# Patient Record
Sex: MALE | Race: WHITE | Employment: OTHER | URBAN - METROPOLITAN AREA
[De-identification: names, ages, dates, MRNs, and addresses within clinical notes are randomized per-mention and may not be internally consistent; named-entity substitution may affect disease eponyms.]

---

## 2018-04-03 ENCOUNTER — HOSPITAL ENCOUNTER (OUTPATIENT)
Age: 48
Discharge: HOME | End: 2018-04-05
Payer: COMMERCIAL

## 2018-04-03 LAB
CHOLESTEROL, TOTAL: 181 MG/DL (ref 0–199)
HDLC SERPL-MCNC: 33 MG/DL
LDL CHOLESTEROL CALCULATED: 108 MG/DL (ref 0–99)
T4 FREE: 1.05 NG/DL (ref 0.93–1.7)
TRIGL SERPL-MCNC: 201 MG/DL (ref 0–149)
TSH SERPL DL<=0.05 MIU/L-ACNC: 1.24 UIU/ML (ref 0.27–4.2)
VITAMIN D 25-HYDROXY: 15 NG/ML (ref 30–100)
VLDLC SERPL CALC-MCNC: 40 MG/DL

## 2018-04-03 PROCEDURE — 82306 VITAMIN D 25 HYDROXY: CPT

## 2018-04-03 PROCEDURE — 84439 ASSAY OF FREE THYROXINE: CPT

## 2018-04-03 PROCEDURE — 80061 LIPID PANEL: CPT

## 2018-04-03 PROCEDURE — 84443 ASSAY THYROID STIM HORMONE: CPT

## 2018-06-19 ENCOUNTER — HOSPITAL ENCOUNTER (EMERGENCY)
Age: 48
Discharge: HOME | End: 2018-06-19
Attending: EMERGENCY MEDICINE
Payer: COMMERCIAL

## 2018-06-19 ENCOUNTER — HOSPITAL ENCOUNTER (EMERGENCY)
Age: 48
Discharge: HOME | End: 2018-06-19
Payer: COMMERCIAL

## 2018-06-19 VITALS
HEART RATE: 58 BPM | SYSTOLIC BLOOD PRESSURE: 168 MMHG | OXYGEN SATURATION: 99 % | TEMPERATURE: 97.7 F | HEIGHT: 69 IN | RESPIRATION RATE: 18 BRPM | WEIGHT: 204 LBS | DIASTOLIC BLOOD PRESSURE: 100 MMHG | BODY MASS INDEX: 30.21 KG/M2

## 2018-06-19 VITALS
BODY MASS INDEX: 29.27 KG/M2 | RESPIRATION RATE: 16 BRPM | DIASTOLIC BLOOD PRESSURE: 80 MMHG | WEIGHT: 204.44 LBS | HEIGHT: 70 IN | OXYGEN SATURATION: 98 % | SYSTOLIC BLOOD PRESSURE: 168 MMHG | TEMPERATURE: 97.6 F | HEART RATE: 73 BPM

## 2018-06-19 DIAGNOSIS — T15.12XA FOREIGN BODY OF CONJUNCTIVA, LEFT, INITIAL ENCOUNTER: Primary | ICD-10-CM

## 2018-06-19 PROCEDURE — 99283 EMERGENCY DEPT VISIT LOW MDM: CPT

## 2018-06-19 PROCEDURE — 6370000000 HC RX 637 (ALT 250 FOR IP): Performed by: EMERGENCY MEDICINE

## 2018-06-19 RX ORDER — GENTAMICIN SULFATE 3 MG/ML
1 SOLUTION/ DROPS OPHTHALMIC EVERY 4 HOURS
Qty: 1 BOTTLE | Refills: 0 | Status: SHIPPED | OUTPATIENT
Start: 2018-06-19 | End: 2018-06-26

## 2018-06-19 RX ORDER — HYDROCODONE BITARTRATE AND ACETAMINOPHEN 5; 325 MG/1; MG/1
1 TABLET ORAL EVERY 6 HOURS PRN
Qty: 10 TABLET | Refills: 0 | Status: SHIPPED | OUTPATIENT
Start: 2018-06-19 | End: 2018-06-22

## 2018-06-19 RX ADMIN — FLUORESCEIN SODIUM 1 MG: 0.6 STRIP OPHTHALMIC at 20:36

## 2018-06-19 ASSESSMENT — PAIN DESCRIPTION - ORIENTATION
ORIENTATION: LEFT
ORIENTATION: LEFT

## 2018-06-19 ASSESSMENT — PAIN DESCRIPTION - DESCRIPTORS
DESCRIPTORS: ACHING;CONSTANT;BURNING
DESCRIPTORS: ACHING

## 2018-06-19 ASSESSMENT — VISUAL ACUITY
OD: 20/30
OS: 20/50
OU: 20/40/

## 2018-06-19 ASSESSMENT — PAIN DESCRIPTION - LOCATION
LOCATION: EYE
LOCATION: EYE

## 2018-06-19 ASSESSMENT — PAIN SCALES - GENERAL
PAINLEVEL_OUTOF10: 10
PAINLEVEL_OUTOF10: 10

## 2019-01-13 ENCOUNTER — HOSPITAL ENCOUNTER (OUTPATIENT)
Age: 49
Discharge: HOME | End: 2019-01-13
Payer: COMMERCIAL

## 2019-01-13 LAB
ALBUMIN SERPL-MCNC: 4.4 G/DL (ref 3.5–5.2)
ALP BLD-CCNC: 81 U/L (ref 40–129)
ALT SERPL-CCNC: 15 U/L (ref 0–40)
ANION GAP SERPL CALCULATED.3IONS-SCNC: 9 MMOL/L (ref 7–16)
AST SERPL-CCNC: 15 U/L (ref 0–39)
BASOPHILS ABSOLUTE: 0.04 E9/L (ref 0–0.2)
BASOPHILS RELATIVE PERCENT: 0.5 % (ref 0–2)
BILIRUB SERPL-MCNC: 0.4 MG/DL (ref 0–1.2)
BUN BLDV-MCNC: 9 MG/DL (ref 6–20)
CALCIUM SERPL-MCNC: 9.4 MG/DL (ref 8.6–10.2)
CHLORIDE BLD-SCNC: 100 MMOL/L (ref 98–107)
CHOLESTEROL, FASTING: 182 MG/DL (ref 0–199)
CO2: 28 MMOL/L (ref 22–29)
CREAT SERPL-MCNC: 1.1 MG/DL (ref 0.7–1.2)
EOSINOPHILS ABSOLUTE: 0.27 E9/L (ref 0.05–0.5)
EOSINOPHILS RELATIVE PERCENT: 3.1 % (ref 0–6)
GFR AFRICAN AMERICAN: >60
GFR NON-AFRICAN AMERICAN: >60 ML/MIN/1.73
GLUCOSE BLD-MCNC: 109 MG/DL (ref 74–99)
HCT VFR BLD CALC: 39.9 % (ref 37–54)
HDLC SERPL-MCNC: 37 MG/DL
HEMOGLOBIN: 13.6 G/DL (ref 12.5–16.5)
IMMATURE GRANULOCYTES #: 0.03 E9/L
IMMATURE GRANULOCYTES %: 0.3 % (ref 0–5)
LDL CHOLESTEROL CALCULATED: 119 MG/DL (ref 0–99)
LYMPHOCYTES ABSOLUTE: 2.29 E9/L (ref 1.5–4)
LYMPHOCYTES RELATIVE PERCENT: 26.3 % (ref 20–42)
MCH RBC QN AUTO: 29.6 PG (ref 26–35)
MCHC RBC AUTO-ENTMCNC: 34.1 % (ref 32–34.5)
MCV RBC AUTO: 86.7 FL (ref 80–99.9)
MONOCYTES ABSOLUTE: 0.53 E9/L (ref 0.1–0.95)
MONOCYTES RELATIVE PERCENT: 6.1 % (ref 2–12)
NEUTROPHILS ABSOLUTE: 5.54 E9/L (ref 1.8–7.3)
NEUTROPHILS RELATIVE PERCENT: 63.7 % (ref 43–80)
PDW BLD-RTO: 12.5 FL (ref 11.5–15)
PLATELET # BLD: 174 E9/L (ref 130–450)
PMV BLD AUTO: 10.8 FL (ref 7–12)
POTASSIUM SERPL-SCNC: 4.9 MMOL/L (ref 3.5–5)
RBC # BLD: 4.6 E12/L (ref 3.8–5.8)
SODIUM BLD-SCNC: 137 MMOL/L (ref 132–146)
T4 FREE: 1.14 NG/DL (ref 0.93–1.7)
TOTAL PROTEIN: 7.6 G/DL (ref 6.4–8.3)
TRIGLYCERIDE, FASTING: 131 MG/DL (ref 0–149)
TSH SERPL DL<=0.05 MIU/L-ACNC: 0.61 UIU/ML (ref 0.27–4.2)
VITAMIN D 25-HYDROXY: 19 NG/ML (ref 30–100)
VLDLC SERPL CALC-MCNC: 26 MG/DL
WBC # BLD: 8.7 E9/L (ref 4.5–11.5)

## 2019-01-13 PROCEDURE — 84439 ASSAY OF FREE THYROXINE: CPT

## 2019-01-13 PROCEDURE — 36415 COLL VENOUS BLD VENIPUNCTURE: CPT

## 2019-01-13 PROCEDURE — 85025 COMPLETE CBC W/AUTO DIFF WBC: CPT

## 2019-01-13 PROCEDURE — 84443 ASSAY THYROID STIM HORMONE: CPT

## 2019-01-13 PROCEDURE — 80061 LIPID PANEL: CPT

## 2019-01-13 PROCEDURE — 80053 COMPREHEN METABOLIC PANEL: CPT

## 2019-01-13 PROCEDURE — 82306 VITAMIN D 25 HYDROXY: CPT

## 2019-08-27 ENCOUNTER — HOSPITAL ENCOUNTER (OUTPATIENT)
Dept: ULTRASOUND IMAGING | Age: 49
Discharge: HOME | End: 2019-08-27
Payer: COMMERCIAL

## 2019-08-27 DIAGNOSIS — H53.9 UNSPECIFIED VISUAL DISTURBANCE: ICD-10-CM

## 2019-08-27 PROCEDURE — 93880 EXTRACRANIAL BILAT STUDY: CPT

## 2019-09-10 ENCOUNTER — HOSPITAL ENCOUNTER (OUTPATIENT)
Dept: NON INVASIVE DIAGNOSTICS | Age: 49
Discharge: HOME | End: 2019-09-10
Payer: COMMERCIAL

## 2019-09-10 ENCOUNTER — HOSPITAL ENCOUNTER (OUTPATIENT)
Dept: NUCLEAR MEDICINE | Age: 49
Discharge: HOME | End: 2019-09-10
Payer: COMMERCIAL

## 2019-09-10 VITALS
HEART RATE: 96 BPM | BODY MASS INDEX: 30.21 KG/M2 | WEIGHT: 204 LBS | SYSTOLIC BLOOD PRESSURE: 160 MMHG | DIASTOLIC BLOOD PRESSURE: 92 MMHG | HEIGHT: 69 IN

## 2019-09-10 DIAGNOSIS — I25.10 CORONARY ARTERIOSCLEROSIS: ICD-10-CM

## 2019-09-10 LAB
LV EF: 64 %
LVEF MODALITY: NORMAL

## 2019-09-10 PROCEDURE — 93017 CV STRESS TEST TRACING ONLY: CPT

## 2019-09-10 PROCEDURE — 3430000000 HC RX DIAGNOSTIC RADIOPHARMACEUTICAL: Performed by: RADIOLOGY

## 2019-09-10 PROCEDURE — 78452 HT MUSCLE IMAGE SPECT MULT: CPT

## 2019-09-10 PROCEDURE — A9500 TC99M SESTAMIBI: HCPCS | Performed by: RADIOLOGY

## 2019-09-10 RX ORDER — OMEPRAZOLE 40 MG/1
40 CAPSULE, DELAYED RELEASE ORAL DAILY
COMMUNITY

## 2019-09-10 RX ADMIN — Medication 30 MILLICURIE: at 14:39

## 2019-09-10 RX ADMIN — Medication 10 MILLICURIE: at 11:21

## 2019-09-10 NOTE — PROCEDURES
Dr. Bennett Martinez present, Completed Nuclear exercise Stress test, achieved desires maximal predicted heart rate. Patient denied chest pain.

## 2021-10-25 ENCOUNTER — HOSPITAL ENCOUNTER (EMERGENCY)
Age: 51
Discharge: HOME | End: 2021-10-25
Attending: EMERGENCY MEDICINE
Payer: COMMERCIAL

## 2021-10-25 ENCOUNTER — APPOINTMENT (OUTPATIENT)
Dept: GENERAL RADIOLOGY | Age: 51
End: 2021-10-25
Payer: COMMERCIAL

## 2021-10-25 VITALS
RESPIRATION RATE: 18 BRPM | OXYGEN SATURATION: 99 % | TEMPERATURE: 98.2 F | SYSTOLIC BLOOD PRESSURE: 149 MMHG | DIASTOLIC BLOOD PRESSURE: 108 MMHG | HEART RATE: 70 BPM

## 2021-10-25 DIAGNOSIS — R00.2 PALPITATIONS: Primary | ICD-10-CM

## 2021-10-25 LAB
ALBUMIN SERPL-MCNC: 4.4 G/DL (ref 3.5–5.2)
ALP BLD-CCNC: 82 U/L (ref 40–129)
ALT SERPL-CCNC: 21 U/L (ref 0–40)
ANION GAP SERPL CALCULATED.3IONS-SCNC: 10 MMOL/L (ref 7–16)
AST SERPL-CCNC: 14 U/L (ref 0–39)
BACTERIA: ABNORMAL /HPF
BASOPHILS ABSOLUTE: 0.05 E9/L (ref 0–0.2)
BASOPHILS RELATIVE PERCENT: 0.7 % (ref 0–2)
BILIRUB SERPL-MCNC: 0.5 MG/DL (ref 0–1.2)
BILIRUBIN URINE: NEGATIVE
BLOOD, URINE: ABNORMAL
BUN BLDV-MCNC: 12 MG/DL (ref 6–20)
CALCIUM SERPL-MCNC: 9.7 MG/DL (ref 8.6–10.2)
CHLORIDE BLD-SCNC: 101 MMOL/L (ref 98–107)
CLARITY: CLEAR
CO2: 26 MMOL/L (ref 22–29)
COLOR: YELLOW
CREAT SERPL-MCNC: 1.1 MG/DL (ref 0.7–1.2)
D DIMER: <200 NG/ML DDU
EKG ATRIAL RATE: 96 BPM
EKG P AXIS: 58 DEGREES
EKG P-R INTERVAL: 124 MS
EKG Q-T INTERVAL: 370 MS
EKG QRS DURATION: 84 MS
EKG QTC CALCULATION (BAZETT): 467 MS
EKG R AXIS: 52 DEGREES
EKG T AXIS: 66 DEGREES
EKG VENTRICULAR RATE: 96 BPM
EOSINOPHILS ABSOLUTE: 0.3 E9/L (ref 0.05–0.5)
EOSINOPHILS RELATIVE PERCENT: 4.1 % (ref 0–6)
ETHANOL: <10 MG/DL (ref 0–0.08)
GFR AFRICAN AMERICAN: >60
GFR NON-AFRICAN AMERICAN: >60 ML/MIN/1.73
GLUCOSE BLD-MCNC: 141 MG/DL (ref 74–99)
GLUCOSE URINE: NEGATIVE MG/DL
HCT VFR BLD CALC: 40.4 % (ref 37–54)
HEMOGLOBIN: 13.7 G/DL (ref 12.5–16.5)
IMMATURE GRANULOCYTES #: 0.03 E9/L
IMMATURE GRANULOCYTES %: 0.4 % (ref 0–5)
KETONES, URINE: NEGATIVE MG/DL
LEUKOCYTE ESTERASE, URINE: NEGATIVE
LYMPHOCYTES ABSOLUTE: 1.76 E9/L (ref 1.5–4)
LYMPHOCYTES RELATIVE PERCENT: 23.9 % (ref 20–42)
MAGNESIUM: 1.7 MG/DL (ref 1.6–2.6)
MCH RBC QN AUTO: 30.9 PG (ref 26–35)
MCHC RBC AUTO-ENTMCNC: 33.9 % (ref 32–34.5)
MCV RBC AUTO: 91.2 FL (ref 80–99.9)
MONOCYTES ABSOLUTE: 0.47 E9/L (ref 0.1–0.95)
MONOCYTES RELATIVE PERCENT: 6.4 % (ref 2–12)
NEUTROPHILS ABSOLUTE: 4.76 E9/L (ref 1.8–7.3)
NEUTROPHILS RELATIVE PERCENT: 64.5 % (ref 43–80)
NITRITE, URINE: NEGATIVE
PDW BLD-RTO: 12.4 FL (ref 11.5–15)
PH UA: 6 (ref 5–9)
PLATELET # BLD: 168 E9/L (ref 130–450)
PMV BLD AUTO: 11.1 FL (ref 7–12)
POTASSIUM SERPL-SCNC: 3.5 MMOL/L (ref 3.5–5)
PROTEIN UA: NEGATIVE MG/DL
RBC # BLD: 4.43 E12/L (ref 3.8–5.8)
RBC UA: ABNORMAL /HPF (ref 0–2)
SODIUM BLD-SCNC: 137 MMOL/L (ref 132–146)
SPECIFIC GRAVITY UA: 1.01 (ref 1–1.03)
TOTAL PROTEIN: 7.4 G/DL (ref 6.4–8.3)
TROPONIN, HIGH SENSITIVITY: 9 NG/L (ref 0–11)
UROBILINOGEN, URINE: 0.2 E.U./DL
WBC # BLD: 7.4 E9/L (ref 4.5–11.5)
WBC UA: ABNORMAL /HPF (ref 0–5)

## 2021-10-25 PROCEDURE — 99283 EMERGENCY DEPT VISIT LOW MDM: CPT

## 2021-10-25 PROCEDURE — 36415 COLL VENOUS BLD VENIPUNCTURE: CPT

## 2021-10-25 PROCEDURE — 2580000003 HC RX 258: Performed by: EMERGENCY MEDICINE

## 2021-10-25 PROCEDURE — 81001 URINALYSIS AUTO W/SCOPE: CPT

## 2021-10-25 PROCEDURE — 80053 COMPREHEN METABOLIC PANEL: CPT

## 2021-10-25 PROCEDURE — 85378 FIBRIN DEGRADE SEMIQUANT: CPT

## 2021-10-25 PROCEDURE — 82077 ASSAY SPEC XCP UR&BREATH IA: CPT

## 2021-10-25 PROCEDURE — 83735 ASSAY OF MAGNESIUM: CPT

## 2021-10-25 PROCEDURE — 85025 COMPLETE CBC W/AUTO DIFF WBC: CPT

## 2021-10-25 PROCEDURE — 93005 ELECTROCARDIOGRAM TRACING: CPT | Performed by: PHYSICIAN ASSISTANT

## 2021-10-25 PROCEDURE — 84484 ASSAY OF TROPONIN QUANT: CPT

## 2021-10-25 PROCEDURE — 73610 X-RAY EXAM OF ANKLE: CPT

## 2021-10-25 RX ORDER — 0.9 % SODIUM CHLORIDE 0.9 %
1000 INTRAVENOUS SOLUTION INTRAVENOUS ONCE
Status: COMPLETED | OUTPATIENT
Start: 2021-10-25 | End: 2021-10-25

## 2021-10-25 RX ADMIN — SODIUM CHLORIDE 1000 ML: 9 INJECTION, SOLUTION INTRAVENOUS at 14:32

## 2021-10-25 ASSESSMENT — ENCOUNTER SYMPTOMS
SHORTNESS OF BREATH: 0
NAUSEA: 0
ABDOMINAL PAIN: 0
VOMITING: 0
CHEST TIGHTNESS: 0
CONSTIPATION: 0
BACK PAIN: 0
DIARRHEA: 1
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
## 2021-10-25 NOTE — ED NOTES
Re-eval per attending ED physician review of findings and discharge [id number] [facility]  10/25/21 9245

## 2021-10-25 NOTE — ED NOTES
FIRST PROVIDER CONTACT ASSESSMENT NOTE                                                                                                Department of Emergency Medicine                                                      First Provider Note  10/25/21  11:22 AM EDT  NAME: Chichi Chong  : 1970  MRN: [id number]    Chief Complaint: Other (dizziness with hot flash this am while at work) and Palpitations      History of Present Illness:   Chcihi Chong is a 49 y.o. male who presents to the ED for near syncope earlier today. C/o pounding in his chest as well. Focused Physical Exam:  VS:    ED Triage Vitals [10/25/21 1046]   BP Temp Temp Source Pulse Resp SpO2 Height Weight   -- 98.4 °F (36.9 °C) Tympanic 114 -- 99 % -- --        General: Alert and in no apparent distress. Medical History:  has a past medical history of Family history of early CAD and History of cardiovascular stress test.    Surgical History:  has a past surgical history that includes Femur fracture surgery () and Cholecystectomy. Social History:  reports that he quit smoking about 7 years ago. He smoked 0.75 packs per day. He has never used smokeless tobacco. He reports current alcohol use. He reports current drug use. Drug: Marijuana. Family History: family history includes Heart Attack in his maternal grandmother; Heart Attack (age of onset: 39) in his mother.     Allergies: Asa [aspirin] and Ibuprofen     Initial Plan of Care:  Initiate Treatment-Testing, Proceed toTreatment Area When Bed Available for ED Attending/MLP to Continue Care    -------------------------------------------------END OF FIRST PROVIDER CONTACT ASSESSMENT NOTE--------------------------------------------------------  Electronically signed by NANCY Neves   DD: 10/25/21       NANCY Neves  10/25/21 1123

## 2022-10-31 ENCOUNTER — APPOINTMENT (OUTPATIENT)
Dept: GENERAL RADIOLOGY | Age: 52
End: 2022-10-31
Payer: COMMERCIAL

## 2022-10-31 ENCOUNTER — APPOINTMENT (OUTPATIENT)
Dept: CT IMAGING | Age: 52
End: 2022-10-31
Payer: COMMERCIAL

## 2022-10-31 ENCOUNTER — HOSPITAL ENCOUNTER (EMERGENCY)
Age: 52
Discharge: HOME | End: 2022-10-31
Payer: COMMERCIAL

## 2022-10-31 VITALS
SYSTOLIC BLOOD PRESSURE: 168 MMHG | HEART RATE: 79 BPM | OXYGEN SATURATION: 96 % | DIASTOLIC BLOOD PRESSURE: 103 MMHG | RESPIRATION RATE: 18 BRPM | TEMPERATURE: 98.5 F

## 2022-10-31 DIAGNOSIS — R03.0 FINDING OF ABOVE NORMAL BLOOD PRESSURE: ICD-10-CM
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
## 2022-10-31 DIAGNOSIS — M54.2 NECK PAIN: ICD-10-CM

## 2022-10-31 DIAGNOSIS — S39.012A STRAIN OF LUMBAR REGION, INITIAL ENCOUNTER: ICD-10-CM

## 2022-10-31 PROCEDURE — 70450 CT HEAD/BRAIN W/O DYE: CPT

## 2022-10-31 PROCEDURE — 72125 CT NECK SPINE W/O DYE: CPT

## 2022-10-31 PROCEDURE — 72072 X-RAY EXAM THORAC SPINE 3VWS: CPT

## 2022-10-31 PROCEDURE — 72100 X-RAY EXAM L-S SPINE 2/3 VWS: CPT

## 2022-10-31 PROCEDURE — 99284 EMERGENCY DEPT VISIT MOD MDM: CPT

## 2022-10-31 PROCEDURE — 6370000000 HC RX 637 (ALT 250 FOR IP): Performed by: NURSE PRACTITIONER

## 2022-10-31 RX ORDER — LIDOCAINE 4 G/G
1 PATCH TOPICAL DAILY
Qty: 10 PATCH | Refills: 0 | Status: SHIPPED | OUTPATIENT
Start: 2022-10-31 | End: 2022-11-10

## 2022-10-31 RX ORDER — OXYCODONE HYDROCHLORIDE AND ACETAMINOPHEN 5; 325 MG/1; MG/1
1 TABLET ORAL ONCE
Status: COMPLETED | OUTPATIENT
Start: 2022-10-31 | End: 2022-10-31

## 2022-10-31 RX ORDER — CYCLOBENZAPRINE HCL 5 MG
5 TABLET ORAL NIGHTLY PRN
Qty: 7 TABLET | Refills: 0 | Status: SHIPPED | OUTPATIENT
Start: 2022-10-31 | End: 2022-11-07

## 2022-10-31 RX ADMIN — OXYCODONE AND ACETAMINOPHEN 1 TABLET: 5; 325 TABLET ORAL at 19:57

## 2022-10-31 ASSESSMENT — PAIN SCALES - GENERAL
PAINLEVEL_OUTOF10: 8
PAINLEVEL_OUTOF10: 8

## 2022-10-31 ASSESSMENT — PAIN - FUNCTIONAL ASSESSMENT: PAIN_FUNCTIONAL_ASSESSMENT: 0-10
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
## 2024-12-09 ENCOUNTER — TELEPHONE (OUTPATIENT)
Dept: PHYSICAL MEDICINE AND REHAB | Age: 54
End: 2024-12-09

## 2024-12-09 NOTE — TELEPHONE ENCOUNTER
Pt called in to [facility] an EMG, he is requesting to have it done with Dr. Han, there are no available appts next year on the EMG for her. Please, advise. Thank you

## 2024-12-12 ENCOUNTER — PROCEDURE VISIT (OUTPATIENT)
Dept: PHYSICAL MEDICINE AND REHAB | Age: 54
End: 2024-12-12

## 2024-12-12 VITALS — HEIGHT: 69 IN | WEIGHT: 193 LBS | BODY MASS INDEX: 28.58 KG/M2

## 2024-12-12 DIAGNOSIS — R20.2 PARESTHESIA OF SKIN: Primary | ICD-10-CM

## 2024-12-12 DIAGNOSIS — R20.2 PARESTHESIA OF SKIN: ICD-10-CM

## 2024-12-12 DIAGNOSIS — G56.03 BILATERAL CARPAL TUNNEL SYNDROME: Primary | ICD-10-CM

## 2024-12-12 NOTE — PATIENT INSTRUCTIONS
Electrodiagnotic Laboratory  Accredited [facility] the [facility]  THUY Maxwell D.O.   [facility]  [address] [place] [address]  Phone: [phone]  Fax: [phone]        Today you had an electrodiagnostic exam which included nerve conduction studies (NCS) and electromyography (EMG). This test evaluated the electrical activity of your nerves and muscles to help determine if you have a nerve or muscle disease.  This test can help determine the location and type of a nerve or muscle problem. This will help your referring doctor diagnose your condition and determine the appropriate next step in your treatment plan.     After your test:    1. There are no long lasting side effects of the test.     2. You may resume your normal activities without restrictions.     3.  Resume any medications that were stopped for the test.     4  If you have sore areas or bruising in your muscles where the needle was placed, apply a cold pack to the sore area for 15-20 minutes three to four times a day as needed for pain.  The soreness should go away in about 1-2 days.     5. Your results were provided  Briefly at the end of your test and the final detailed report will be provided to your referring physician, and/or primary care physician and any other parties you requested within 1-2 days of the examination. You may wish to contact your referring provider after a few days to determine what they would like you to do next.     6.  Please call [phone] with any questions or concerns and if you develop increased body temperature/fever, swelling, tenderness, increased pain and/or drainage from the sites where the needle was placed.     Thank you for choosing us for your health care needs.

## 2024-12-12 NOTE — PROGRESS NOTES
[facility]  Electrodiagnostic Laboratory  *[facility] the [facility] with [facility] status  [address] [place] [address]  Phone: [phone]  Fax: [phone]    Referring Provider: Juan Byrnes, *  Primary Care Physician: Juan Byrnes,   Patient Name: Killian Au  Patient YOB: 1970  Gender: male  BMI: Body mass index is 28.5 kg/m².  Height 1.753 m (5' 9\"), weight 87.5 kg (193 lb).    12/12/2024    Reason for Referral: Paresthesia of skin    Description of clinical problem:   Chief Complaint   Patient presents with    Extremity Pain     Pain in both hands and at times the arm. Pins and needles feeling in the pads of the feet into the toes. Both sides feel the same.     Numbness     Numbness/tingling in the right forearm and into the last two fingers.     Extremity Weakness     Decrease strength in the arm and hands.        Brief physical exam:   Sensory deficit Yes- decreased sensation LT in toes bilaterally; Weakness No; Atrophy  No; Reflex abnormality No    Sensory NCS      Nerve / Sites Rec. Site Peak Lat PP Amp Segments Distance Velocity Temp.     ms µV  cm m/s °C   R Median - Digit II (Antidromic)      Palm Dig II 1.98 13.4 Palm - Dig II 7 48 33      Wrist Dig II 4.06 13.3 Wrist - Dig II 14 44 33   L Median - Digit II (Antidromic)      Palm Dig II 1.82 22.3 Palm - Dig II 7 58 33      Wrist Dig II 3.65 21.3 Wrist - Dig II 14 46 33   R Ulnar - Digit V (Antidromic)      Wrist Dig V 3.54 20.5 Wrist - Dig V 14 52 33   R Radial - Anatomical snuff box (Forearm)      Forearm Wrist 2.45 21.5 Forearm - Wrist 10 58 32.5   R Sural - Ankle (Calf)      Calf Ankle 3.96 20.5 Calf - Ankle 14 43 31.6   R Superficial peroneal - Ankle      Lat leg Ankle 3.23 16.5 Lat leg - Ankle 10 42 31.5       Combined Sensory Index      Nerve / Sites Rec. Site Peak Lat NP Amp PP Amp Segments Dist. Peak Diff Temp.     ms µV µV  cm ms °C   L Median - CSI      Median Thumb

## 2025-01-05 ENCOUNTER — HOSPITAL ENCOUNTER (EMERGENCY)
Age: 55
Discharge: HOME | End: 2025-01-05
Attending: STUDENT IN AN ORGANIZED HEALTH CARE EDUCATION/TRAINING PROGRAM
Payer: COMMERCIAL

## 2025-01-05 ENCOUNTER — APPOINTMENT (OUTPATIENT)
Dept: CT IMAGING | Age: 55
End: 2025-01-05
Payer: COMMERCIAL

## 2025-01-05 VITALS
RESPIRATION RATE: 14 BRPM | HEART RATE: 98 BPM | OXYGEN SATURATION: 93 % | TEMPERATURE: 97.4 F | DIASTOLIC BLOOD PRESSURE: 92 MMHG | SYSTOLIC BLOOD PRESSURE: 133 MMHG

## 2025-01-05 DIAGNOSIS — R19.7 NAUSEA VOMITING AND DIARRHEA: Primary | ICD-10-CM

## 2025-01-05 DIAGNOSIS — I73.9 PAD (PERIPHERAL ARTERY DISEASE) (HCC): ICD-10-CM

## 2025-01-05 DIAGNOSIS — R11.2 NAUSEA VOMITING AND DIARRHEA: Primary | ICD-10-CM

## 2025-01-05 DIAGNOSIS — I71.43 INFRARENAL ABDOMINAL AORTIC ANEURYSM (AAA) WITHOUT RUPTURE (HCC): ICD-10-CM

## 2025-01-05 LAB
ALBUMIN SERPL-MCNC: 4.2 G/DL (ref 3.5–5.2)
ALP SERPL-CCNC: 98 U/L (ref 40–129)
ALT SERPL-CCNC: 12 U/L (ref 0–40)
ANION GAP SERPL CALCULATED.3IONS-SCNC: 15 MMOL/L (ref 7–16)
AST SERPL-CCNC: 12 U/L (ref 0–39)
BASOPHILS # BLD: 0.06 K/UL (ref 0–0.2)
BASOPHILS NFR BLD: 0 % (ref 0–2)
BILIRUB SERPL-MCNC: 0.3 MG/DL (ref 0–1.2)
BUN SERPL-MCNC: 17 MG/DL (ref 6–20)
CALCIUM SERPL-MCNC: 9.4 MG/DL (ref 8.6–10.2)
CHLORIDE SERPL-SCNC: 103 MMOL/L (ref 98–107)
CK SERPL-CCNC: 51 U/L (ref 20–200)
CO2 SERPL-SCNC: 23 MMOL/L (ref 22–29)
CREAT SERPL-MCNC: 1.3 MG/DL (ref 0.7–1.2)
EOSINOPHIL # BLD: 0.3 K/UL (ref 0.05–0.5)
EOSINOPHILS RELATIVE PERCENT: 2 % (ref 0–6)
ERYTHROCYTE [DISTWIDTH] IN BLOOD BY AUTOMATED COUNT: 12.2 % (ref 11.5–15)
FLUAV RNA RESP QL NAA+PROBE: NOT DETECTED
FLUBV RNA RESP QL NAA+PROBE: NOT DETECTED
GFR, ESTIMATED: 68 ML/MIN/1.73M2
GLUCOSE SERPL-MCNC: 162 MG/DL (ref 74–99)
HCT VFR BLD AUTO: 47.8 % (ref 37–54)
HGB BLD-MCNC: 16.2 G/DL (ref 12.5–16.5)
IMM GRANULOCYTES # BLD AUTO: 0.16 K/UL (ref 0–0.58)
IMM GRANULOCYTES NFR BLD: 1 % (ref 0–5)
LACTATE BLDV-SCNC: 2.5 MMOL/L (ref 0.5–1.9)
LACTATE BLDV-SCNC: 2.6 MMOL/L (ref 0.5–2.2)
LACTATE BLDV-SCNC: 2.9 MMOL/L (ref 0.5–1.9)
LACTATE BLDV-SCNC: 3.2 MMOL/L (ref 0.5–2.2)
LIPASE SERPL-CCNC: 54 U/L (ref 13–60)
LYMPHOCYTES NFR BLD: 1.38 K/UL (ref 1.5–4)
LYMPHOCYTES RELATIVE PERCENT: 8 % (ref 20–42)
MCH RBC QN AUTO: 30.9 PG (ref 26–35)
MCHC RBC AUTO-ENTMCNC: 33.9 G/DL (ref 32–34.5)
MCV RBC AUTO: 91 FL (ref 80–99.9)
MONOCYTES NFR BLD: 0.85 K/UL (ref 0.1–0.95)
MONOCYTES NFR BLD: 5 % (ref 2–12)
NEUTROPHILS NFR BLD: 84 % (ref 43–80)
NEUTS SEG NFR BLD: 14.75 K/UL (ref 1.8–7.3)
PLATELET # BLD AUTO: 216 K/UL (ref 130–450)
PMV BLD AUTO: 10.8 FL (ref 7–12)
POTASSIUM SERPL-SCNC: 3.9 MMOL/L (ref 3.5–5)
PROT SERPL-MCNC: 7.4 G/DL (ref 6.4–8.3)
RBC # BLD AUTO: 5.25 M/UL (ref 3.8–5.8)
SARS-COV-2 RNA RESP QL NAA+PROBE: NOT DETECTED
SODIUM SERPL-SCNC: 141 MMOL/L (ref 132–146)
SOURCE: NORMAL
SPECIMEN DESCRIPTION: NORMAL
TROPONIN I SERPL HS-MCNC: <6 NG/L (ref 0–11)
TROPONIN I SERPL HS-MCNC: <6 NG/L (ref 0–11)
WBC OTHER # BLD: 17.5 K/UL (ref 4.5–11.5)

## 2025-01-05 PROCEDURE — 80053 COMPREHEN METABOLIC PANEL: CPT

## 2025-01-05 PROCEDURE — 96375 TX/PRO/DX INJ NEW DRUG ADDON: CPT

## 2025-01-05 PROCEDURE — 82550 ASSAY OF CK (CPK): CPT

## 2025-01-05 PROCEDURE — 87636 SARSCOV2 & INF A&B AMP PRB: CPT

## 2025-01-05 PROCEDURE — 6360000002 HC RX W HCPCS: Performed by: EMERGENCY MEDICINE

## 2025-01-05 PROCEDURE — 2580000003 HC RX 258: Performed by: EMERGENCY MEDICINE

## 2025-01-05 PROCEDURE — 84484 ASSAY OF TROPONIN QUANT: CPT

## 2025-01-05 PROCEDURE — 83605 ASSAY OF LACTIC ACID: CPT

## 2025-01-05 PROCEDURE — 83690 ASSAY OF LIPASE: CPT

## 2025-01-05 PROCEDURE — 6370000000 HC RX 637 (ALT 250 FOR IP): Performed by: EMERGENCY MEDICINE

## 2025-01-05 PROCEDURE — 75635 CT ANGIO ABDOMINAL ARTERIES: CPT

## 2025-01-05 PROCEDURE — 85025 COMPLETE CBC W/AUTO DIFF WBC: CPT

## 2025-01-05 PROCEDURE — 96374 THER/PROPH/DIAG INJ IV PUSH: CPT

## 2025-01-05 PROCEDURE — 99285 EMERGENCY DEPT VISIT HI MDM: CPT

## 2025-01-05 PROCEDURE — 2580000003 HC RX 258: Performed by: STUDENT IN AN ORGANIZED HEALTH CARE EDUCATION/TRAINING PROGRAM

## 2025-01-05 PROCEDURE — 96361 HYDRATE IV INFUSION ADD-ON: CPT

## 2025-01-05 PROCEDURE — 87040 BLOOD CULTURE FOR BACTERIA: CPT

## 2025-01-05 PROCEDURE — 93005 ELECTROCARDIOGRAM TRACING: CPT | Performed by: STUDENT IN AN ORGANIZED HEALTH CARE EDUCATION/TRAINING PROGRAM

## 2025-01-05 PROCEDURE — 6360000004 HC RX CONTRAST MEDICATION: Performed by: RADIOLOGY

## 2025-01-05 PROCEDURE — 6360000002 HC RX W HCPCS: Performed by: STUDENT IN AN ORGANIZED HEALTH CARE EDUCATION/TRAINING PROGRAM

## 2025-01-05 RX ORDER — OXYCODONE AND ACETAMINOPHEN 10; 325 MG/1; MG/1
1 TABLET ORAL ONCE
Status: COMPLETED | OUTPATIENT
Start: 2025-01-05 | End: 2025-01-05

## 2025-01-05 RX ORDER — LORAZEPAM 1 MG/1
1 TABLET ORAL ONCE
Status: COMPLETED | OUTPATIENT
Start: 2025-01-05 | End: 2025-01-05

## 2025-01-05 RX ORDER — ONDANSETRON 2 MG/ML
4 INJECTION INTRAMUSCULAR; INTRAVENOUS ONCE
Status: COMPLETED | OUTPATIENT
Start: 2025-01-05 | End: 2025-01-05

## 2025-01-05 RX ORDER — FENTANYL CITRATE 0.05 MG/ML
50 INJECTION, SOLUTION INTRAMUSCULAR; INTRAVENOUS ONCE
Status: COMPLETED | OUTPATIENT
Start: 2025-01-05 | End: 2025-01-05

## 2025-01-05 RX ORDER — 0.9 % SODIUM CHLORIDE 0.9 %
1000 INTRAVENOUS SOLUTION INTRAVENOUS ONCE
Status: COMPLETED | OUTPATIENT
Start: 2025-01-05 | End: 2025-01-05

## 2025-01-05 RX ORDER — IOPAMIDOL 755 MG/ML
110 INJECTION, SOLUTION INTRAVASCULAR
Status: COMPLETED | OUTPATIENT
Start: 2025-01-05 | End: 2025-01-05

## 2025-01-05 RX ORDER — MORPHINE SULFATE 4 MG/ML
4 INJECTION, SOLUTION INTRAMUSCULAR; INTRAVENOUS ONCE
Status: COMPLETED | OUTPATIENT
Start: 2025-01-05 | End: 2025-01-05

## 2025-01-05 RX ORDER — GABAPENTIN 300 MG/1
300 CAPSULE ORAL ONCE
Status: COMPLETED | OUTPATIENT
Start: 2025-01-05 | End: 2025-01-05

## 2025-01-05 RX ADMIN — SODIUM CHLORIDE 1000 ML: 9 INJECTION, SOLUTION INTRAVENOUS at 05:43

## 2025-01-05 RX ADMIN — GABAPENTIN 300 MG: 300 CAPSULE ORAL at 09:12

## 2025-01-05 RX ADMIN — FENTANYL CITRATE 50 MCG: 0.05 INJECTION, SOLUTION INTRAMUSCULAR; INTRAVENOUS at 05:43

## 2025-01-05 RX ADMIN — OXYCODONE AND ACETAMINOPHEN 1 TABLET: 10; 325 TABLET ORAL at 13:39

## 2025-01-05 RX ADMIN — LORAZEPAM 1 MG: 1 TABLET ORAL at 09:12

## 2025-01-05 RX ADMIN — SODIUM CHLORIDE 1000 ML: 9 INJECTION, SOLUTION INTRAVENOUS at 15:02

## 2025-01-05 RX ADMIN — ONDANSETRON 4 MG: 2 INJECTION, SOLUTION INTRAMUSCULAR; INTRAVENOUS at 05:43

## 2025-01-05 RX ADMIN — MORPHINE SULFATE 4 MG: 4 INJECTION, SOLUTION INTRAMUSCULAR; INTRAVENOUS at 09:12

## 2025-01-05 RX ADMIN — IOPAMIDOL 110 ML: 755 INJECTION, SOLUTION INTRAVENOUS at 07:27

## 2025-01-05 ASSESSMENT — PAIN SCALES - GENERAL
PAINLEVEL_OUTOF10: 10
PAINLEVEL_OUTOF10: 7
PAINLEVEL_OUTOF10: 10
PAINLEVEL_OUTOF10: 8

## 2025-01-05 ASSESSMENT — PAIN DESCRIPTION - DESCRIPTORS: DESCRIPTORS: ACHING;SHARP

## 2025-01-05 ASSESSMENT — PAIN - FUNCTIONAL ASSESSMENT
PAIN_FUNCTIONAL_ASSESSMENT: 0-10
PAIN_FUNCTIONAL_ASSESSMENT: ACTIVITIES ARE NOT PREVENTED

## 2025-01-05 ASSESSMENT — PAIN DESCRIPTION - LOCATION
LOCATION: ABDOMEN;HIP;LEG
LOCATION: ABDOMEN;HIP

## 2025-01-05 ASSESSMENT — PAIN DESCRIPTION - ORIENTATION: ORIENTATION: RIGHT;LEFT

## 2025-01-05 NOTE — ED PROVIDER NOTES
5. Some fluid-filled loops of mid and distal small bowel. There is fluid as   well seen scattered throughout the colon. Findings to suggest a nonspecific   enterocolitis. No significant wall thickening. No signs of obstruction.   6. Some bronchial wall thickening identified into the lower lung fields to   suggest a nonspecific bronchiolitis.             ------------------------- NURSING NOTES AND VITALS REVIEWED ---------------------------  Date / Time Roomed:  1/5/2025  5:07 AM  ED Bed Assignment:  [facility]/H4    The nursing notes within the ED encounter and vital signs as below have been reviewed.   BP (!) 133/92   Pulse 98   Temp 97.4 °F (36.3 °C) (Oral)   Resp 14   SpO2 93%   Oxygen Saturation Interpretation: Normal      --------------------------------- ADDITIONAL PROVIDER NOTES ---------------------------------  At this time the patient is without objective evidence of an acute process requiring hospitalization or inpatient management.  They have remained hemodynamically stable throughout their entire ED visit and are stable for discharge with outpatient follow-up.     The plan has been discussed in detail and they are aware of the specific conditions for emergent return, as well as the importance of follow-up.      Discharge Medication List as of 1/5/2025  3:34 PM          Diagnosis:  1. Nausea vomiting and diarrhea    2. Infrarenal abdominal aortic aneurysm (AAA) without rupture ([facility])    3. PAD (peripheral artery disease) ([facility])        Disposition:  Patient's disposition: Discharge to home  Patient's condition is stable.            Tuan Parisi DO  01/05/25 2035

## 2025-01-06 ENCOUNTER — TELEPHONE (OUTPATIENT)
Dept: VASCULAR SURGERY | Age: 55
End: 2025-01-06

## 2025-01-06 PROBLEM — I77.1 RIGHT ILIAC ARTERY STENOSIS (HCC): Status: ACTIVE | Noted: 2025-01-06

## 2025-01-06 PROBLEM — I70.209 FEMORAL-POPLITEAL ATHEROSCLEROSIS (HCC): Status: ACTIVE | Noted: 2025-01-06

## 2025-01-06 PROBLEM — I71.43 INFRARENAL ABDOMINAL AORTIC ANEURYSM (AAA) WITHOUT RUPTURE (HCC): Status: ACTIVE | Noted: 2025-01-06

## 2025-01-06 LAB
EKG ATRIAL RATE: 105 BPM
EKG P AXIS: 66 DEGREES
EKG P-R INTERVAL: 122 MS
EKG Q-T INTERVAL: 336 MS
EKG QRS DURATION: 86 MS
EKG QTC CALCULATION (BAZETT): 444 MS
EKG R AXIS: 78 DEGREES
EKG T AXIS: -6 DEGREES
EKG VENTRICULAR RATE: 105 BPM

## 2025-01-06 PROCEDURE — 93010 ELECTROCARDIOGRAM REPORT: CPT | Performed by: INTERNAL MEDICINE

## 2025-01-10 LAB
MICROORGANISM SPEC CULT: NORMAL
MICROORGANISM SPEC CULT: NORMAL
SERVICE CMNT-IMP: NORMAL
SERVICE CMNT-IMP: NORMAL
SPECIMEN DESCRIPTION: NORMAL
SPECIMEN DESCRIPTION: NORMAL

## 2025-01-23 ENCOUNTER — OFFICE VISIT (OUTPATIENT)
Dept: VASCULAR SURGERY | Age: 55
End: 2025-01-23
Payer: COMMERCIAL

## 2025-01-23 VITALS — BODY MASS INDEX: 28.5 KG/M2 | WEIGHT: 193 LBS

## 2025-01-23 DIAGNOSIS — I73.9 PVD (PERIPHERAL VASCULAR DISEASE) WITH CLAUDICATION (HCC): ICD-10-CM

## 2025-01-23 DIAGNOSIS — I70.209 FEMORAL-POPLITEAL ATHEROSCLEROSIS (HCC): ICD-10-CM

## 2025-01-23 DIAGNOSIS — I71.43 INFRARENAL ABDOMINAL AORTIC ANEURYSM (AAA) WITHOUT RUPTURE (HCC): ICD-10-CM

## 2025-01-23 DIAGNOSIS — I77.1 RIGHT ILIAC ARTERY STENOSIS (HCC): Primary | ICD-10-CM

## 2025-01-23 DIAGNOSIS — Z87.891 HISTORY OF TOBACCO USE: ICD-10-CM

## 2025-01-23 PROCEDURE — 3017F COLORECTAL CA SCREEN DOC REV: CPT | Performed by: SURGERY

## 2025-01-23 PROCEDURE — 99204 OFFICE O/P NEW MOD 45 MIN: CPT | Performed by: SURGERY

## 2025-01-23 PROCEDURE — G8427 DOCREV CUR MEDS BY ELIG CLIN: HCPCS | Performed by: SURGERY

## 2025-01-23 PROCEDURE — G8419 CALC BMI OUT NRM PARAM NOF/U: HCPCS | Performed by: SURGERY

## 2025-01-23 PROCEDURE — 1036F TOBACCO NON-USER: CPT | Performed by: SURGERY

## 2025-01-23 RX ORDER — LOSARTAN POTASSIUM 50 MG/1
50 TABLET ORAL DAILY
COMMUNITY

## 2025-01-23 RX ORDER — PANTOPRAZOLE SODIUM 40 MG/10ML
40 INJECTION, POWDER, LYOPHILIZED, FOR SOLUTION INTRAVENOUS DAILY
COMMUNITY

## 2025-01-23 RX ORDER — LORAZEPAM 1 MG/1
1 TABLET ORAL EVERY 6 HOURS PRN
COMMUNITY

## 2025-01-23 RX ORDER — GABAPENTIN 300 MG/1
300 CAPSULE ORAL 3 TIMES DAILY
COMMUNITY

## 2025-01-23 RX ORDER — ASPIRIN 81 MG/1
81 TABLET ORAL DAILY
Qty: 30 TABLET | Refills: 3
Start: 2025-01-23

## 2025-01-23 NOTE — PROGRESS NOTES
below    Patient does have infrarenal abdominal aortic-ism, transverse diameter 4.6 and AP diameter 4.9 cm  Right iliac artery stenosis, proximal 50%  Approximate 50 to 60% right distal superficial femoral artery stenosis episodes mild disease on the left side    Assessment:    1. Right iliac artery stenosis (HCC)    2. Infrarenal abdominal aortic aneurysm (AAA) without rupture (HCC)    3. Femoral-popliteal atherosclerosis (HCC)    4. PVD (peripheral vascular disease) with claudication (HCC)    5. History of tobacco use              Plan:       Discussed in detail with patient regarding all options, risks benefits and alternatives in fact I have shown the actual CAT scan films to him, the natural history was explained slow gradual increase in size of his aneurysm and worsening of vascular status, patient recommended, call and come to the hospital if any abdominal symptoms or any worsening of back symptoms, he was explained, any intervention be considered, only if the ileus was greater than 5 cm or any symptoms related to the aneurysm    Patient also recommend to make sure that he does follow-up his medical issues with his PCP, especially evaluation of hyperlipidemia etc.    He was also given a lower extremity artery Doppler study for documentation of vascular status    Patient also recommended, to take 81 mg Ecotrin daily, has taken in the past, had no major side effects, but many years ago he did have some nosebleeding when he was taking ibuprofen also      Patient was instructed to continue walking program and to call if any worsening of symptoms and to call if any focal lateralizing neurological symptoms like loss of speech, vision or loss of function of extremity.      All the questions were answered.      Orders Placed This Encounter   Procedures    Vascular lower extremity arterial segmental pressures w PPG             Indicated follow-up: Return in about 6 months (around 7/23/2025), or if symptoms worsen or

## 2025-02-04 ENCOUNTER — OFFICE VISIT (OUTPATIENT)
Dept: ORTHOPEDIC SURGERY | Age: 55
End: 2025-02-04
Payer: COMMERCIAL

## 2025-02-04 VITALS — BODY MASS INDEX: 28.58 KG/M2 | WEIGHT: 193 LBS | HEIGHT: 69 IN | TEMPERATURE: 98.6 F

## 2025-02-04 DIAGNOSIS — M65.341 TRIGGER FINGER, RIGHT RING FINGER: ICD-10-CM

## 2025-02-04 DIAGNOSIS — G56.01 RIGHT CARPAL TUNNEL SYNDROME: Primary | ICD-10-CM

## 2025-02-04 DIAGNOSIS — G56.02 LEFT CARPAL TUNNEL SYNDROME: ICD-10-CM

## 2025-02-04 PROCEDURE — 99203 OFFICE O/P NEW LOW 30 MIN: CPT | Performed by: ORTHOPAEDIC SURGERY

## 2025-02-04 PROCEDURE — 3017F COLORECTAL CA SCREEN DOC REV: CPT | Performed by: ORTHOPAEDIC SURGERY

## 2025-02-04 PROCEDURE — G8427 DOCREV CUR MEDS BY ELIG CLIN: HCPCS | Performed by: ORTHOPAEDIC SURGERY

## 2025-02-04 PROCEDURE — G8419 CALC BMI OUT NRM PARAM NOF/U: HCPCS | Performed by: ORTHOPAEDIC SURGERY

## 2025-02-04 PROCEDURE — 1036F TOBACCO NON-USER: CPT | Performed by: ORTHOPAEDIC SURGERY

## 2025-02-04 RX ORDER — CELECOXIB 200 MG/1
200 CAPSULE ORAL DAILY
Qty: 30 CAPSULE | Refills: 3 | Status: SHIPPED | OUTPATIENT
Start: 2025-02-04 | End: 2025-06-04

## 2025-02-04 NOTE — PROGRESS NOTES
Chief Complaint   Patient presents with    Hand Pain     Bilateral hand pain. Carpal tunnel. Pain began about a year ago. Pain had worsened over time. Reports intermittent numbness and tingling. Pain described as sharp and shooting. Pain level today 8/10.        Killian Au is a 55 y.o. year old  male who presents for evaluation of bilateral wrist pain R>L.  he reports this started about 1 year ago.  he does not remember a specific injury that started the pain.  He is complaining of the right ring finger catching.  He is having numbness and tingling in the pinky finger. The injury was none. The pain is worse with movement and better with rest.  The patient has tried nothing specific.  The treatment has not been effective.  The patient is right dominant. He is a .    Past Medical History:   Diagnosis Date    Family history of early CAD     Mother age 45    MGM CABG x 3    History of cardiovascular stress test 2015    [facility]     Past Surgical History:   Procedure Laterality Date    CHOLECYSTECTOMY      FEMUR FRACTURE SURGERY  1984    right       Current Outpatient Medications:     celecoxib (CELEBREX) 200 MG capsule, Take 1 capsule by mouth daily, Disp: 30 capsule, Rfl: 3    gabapentin (NEURONTIN) 300 MG capsule, Take 1 capsule by mouth 3 times daily., Disp: , Rfl:     LORazepam (ATIVAN) 1 MG tablet, Take 1 tablet by mouth every 6 hours as needed for Anxiety., Disp: , Rfl:     pantoprazole (PROTONIX) 40 MG injection, Infuse 40 mg intravenously daily, Disp: , Rfl:     losartan (COZAAR) 50 MG tablet, Take 1 tablet by mouth daily, Disp: , Rfl:     aspirin 81 MG EC tablet, Take 1 tablet by mouth daily, Disp: 30 tablet, Rfl: 3    Ergocalciferol (VITAMIN D2 PO), Take by mouth Indications: 50,000  twice a week, Disp: , Rfl:     omeprazole (PRILOSEC) 40 MG delayed release capsule, Take 1 capsule by mouth daily, Disp: , Rfl:     aspirin 81 MG chewable tablet, Take 1 tablet by mouth daily, Disp:

## 2025-02-06 ENCOUNTER — HOSPITAL ENCOUNTER (OUTPATIENT)
Dept: CARDIOLOGY | Age: 55
Discharge: HOME | End: 2025-02-08
Payer: COMMERCIAL

## 2025-02-06 DIAGNOSIS — I71.43 INFRARENAL ABDOMINAL AORTIC ANEURYSM (AAA) WITHOUT RUPTURE (HCC): ICD-10-CM

## 2025-02-06 DIAGNOSIS — I70.209 FEMORAL-POPLITEAL ATHEROSCLEROSIS (HCC): ICD-10-CM

## 2025-02-06 DIAGNOSIS — I73.9 PVD (PERIPHERAL VASCULAR DISEASE) WITH CLAUDICATION (HCC): ICD-10-CM

## 2025-02-06 DIAGNOSIS — I77.1 RIGHT ILIAC ARTERY STENOSIS (HCC): ICD-10-CM

## 2025-02-06 LAB
VAS LEFT ABI: 0.84
VAS LEFT ANKLE BP: 121 MMHG
VAS LEFT ARM BP: 132 MMHG
VAS LEFT CALF PRESSURE: 127 MMHG
VAS LEFT DORSALIS PEDIS BP: 120 MMHG
VAS LEFT PTA BP: 121 MMHG
VAS LEFT TBI: 0.63
VAS LEFT THIGH PRESSURE: 134 MMHG
VAS LEFT TOE PRESSURE: 90 MMHG
VAS RIGHT ABI: 0.97
VAS RIGHT ANKLE BP: 139 MMHG
VAS RIGHT ARM BP: 144 MMHG
VAS RIGHT CALF PRESSURE: 149 MMHG
VAS RIGHT DORSALIS PEDIS BP: 138 MMHG
VAS RIGHT PTA BP: 139 MMHG
VAS RIGHT TBI: 0.77
VAS RIGHT THIGH PRESSURE: 169 MMHG
VAS RIGHT TOE PRESSURE: 111 MMHG

## 2025-02-06 PROCEDURE — 93923 UPR/LXTR ART STDY 3+ LVLS: CPT | Performed by: SURGERY

## 2025-02-06 PROCEDURE — 93923 UPR/LXTR ART STDY 3+ LVLS: CPT

## 2025-02-07 ENCOUNTER — OFFICE VISIT (OUTPATIENT)
Dept: ORTHOPEDIC SURGERY | Age: 55
End: 2025-02-07

## 2025-02-07 ENCOUNTER — TELEPHONE (OUTPATIENT)
Dept: VASCULAR SURGERY | Age: 55
End: 2025-02-07

## 2025-02-07 VITALS — HEIGHT: 69 IN | WEIGHT: 193 LBS | BODY MASS INDEX: 28.58 KG/M2

## 2025-02-07 DIAGNOSIS — G56.02 LEFT CARPAL TUNNEL SYNDROME: ICD-10-CM

## 2025-02-07 DIAGNOSIS — G56.01 RIGHT CARPAL TUNNEL SYNDROME: Primary | ICD-10-CM

## 2025-02-07 RX ORDER — BETAMETHASONE SODIUM PHOSPHATE AND BETAMETHASONE ACETATE 3; 3 MG/ML; MG/ML
3 INJECTION, SUSPENSION INTRA-ARTICULAR; INTRALESIONAL; INTRAMUSCULAR; SOFT TISSUE ONCE
Status: COMPLETED | OUTPATIENT
Start: 2025-02-07 | End: 2025-02-07

## 2025-02-07 RX ORDER — LIDOCAINE HYDROCHLORIDE 10 MG/ML
0.5 INJECTION, SOLUTION INFILTRATION; PERINEURAL ONCE
Status: COMPLETED | OUTPATIENT
Start: 2025-02-07 | End: 2025-02-07

## 2025-02-07 RX ADMIN — LIDOCAINE HYDROCHLORIDE 0.5 ML: 10 INJECTION, SOLUTION INFILTRATION; PERINEURAL at 15:52

## 2025-02-07 RX ADMIN — BETAMETHASONE SODIUM PHOSPHATE AND BETAMETHASONE ACETATE 3 MG: 3; 3 INJECTION, SUSPENSION INTRA-ARTICULAR; INTRALESIONAL; INTRAMUSCULAR; SOFT TISSUE at 15:49

## 2025-02-07 RX ADMIN — BETAMETHASONE SODIUM PHOSPHATE AND BETAMETHASONE ACETATE 3 MG: 3; 3 INJECTION, SUSPENSION INTRA-ARTICULAR; INTRALESIONAL; INTRAMUSCULAR; SOFT TISSUE at 15:50

## 2025-02-07 RX ADMIN — LIDOCAINE HYDROCHLORIDE 0.5 ML: 10 INJECTION, SOLUTION INFILTRATION; PERINEURAL at 15:51

## 2025-02-07 NOTE — PROGRESS NOTES
[facility]  [facility]  DATE OF VISIT : 2025    Patient : Killian Au  Age : 55 y.o.   : 1970  MRN : [id number]   ______________________________________________________________________    Chief Complaint :   Chief Complaint   Patient presents with    Carpal Tunnel     Patient presents today for b/l carpal tunnel pain. Pain is described as sharp and shooting in the palm of his hands. Patient is RHD. Patient has complaints of tingling in his hands.His right ring finger gets stuck sometimes. He is a  and uses his hands a lot.        HPI : Killian Au is 55 y.o. right hand dominant  male who presented to the clinic for evaluation of bilateral  wrist pain right greater than left.     Today 2025, he says the pain started about a year ago.  He localizes majority of his pain to his anterior hand but does have numbness and tingling in the pinky and ring finger.  An EMG recently which showed moderate carpal tunnel syndrome bilaterally.  He was referred by Dr. Fernandez for a diagnostic and hopefully therapeutic bilateral corticosteroid injection into the carpal tunnel.  Patient has failed previous conservative treatments.    ROS:  All pertinent positive symptoms are included in the history of present illness.    Past Medical History :  Past Medical History:   Diagnosis Date    Family history of early CAD     Mother age 45    MGM CABG x 3    History of cardiovascular stress test 2015    [facility]     Past Surgical History:   Procedure Laterality Date    CHOLECYSTECTOMY      FEMUR FRACTURE SURGERY  1984    right       Allergies :   No Known Allergies      Medication List :    Current Outpatient Medications   Medication Sig Dispense Refill    celecoxib (CELEBREX) 200 MG capsule Take 1 capsule by mouth daily 30 capsule 3    gabapentin (NEURONTIN) 300 MG capsule Take 1 capsule by mouth 3 times daily.      LORazepam (ATIVAN) 1 MG tablet Take 1 tablet by mouth every

## 2025-02-07 NOTE — TELEPHONE ENCOUNTER
Discussed the patient regarding ankle-brachial index, 0.97 on the right and 0.84 on the left, reassured, instructed him to keep the appointment see me back in 6 months but come to the hospital for any abdominal or back pain, all his questions were answered

## 2025-03-04 ENCOUNTER — OFFICE VISIT (OUTPATIENT)
Dept: ORTHOPEDIC SURGERY | Age: 55
End: 2025-03-04
Payer: COMMERCIAL

## 2025-03-04 VITALS — HEIGHT: 69 IN | BODY MASS INDEX: 28.58 KG/M2 | TEMPERATURE: 98 F | WEIGHT: 193 LBS

## 2025-03-04 DIAGNOSIS — G56.01 RIGHT CARPAL TUNNEL SYNDROME: Primary | ICD-10-CM

## 2025-03-04 DIAGNOSIS — G56.02 LEFT CARPAL TUNNEL SYNDROME: ICD-10-CM

## 2025-03-04 PROCEDURE — 99213 OFFICE O/P EST LOW 20 MIN: CPT | Performed by: ORTHOPAEDIC SURGERY

## 2025-03-04 PROCEDURE — G8419 CALC BMI OUT NRM PARAM NOF/U: HCPCS | Performed by: ORTHOPAEDIC SURGERY

## 2025-03-04 PROCEDURE — 1036F TOBACCO NON-USER: CPT | Performed by: ORTHOPAEDIC SURGERY

## 2025-03-04 PROCEDURE — G8427 DOCREV CUR MEDS BY ELIG CLIN: HCPCS | Performed by: ORTHOPAEDIC SURGERY

## 2025-03-04 PROCEDURE — 3017F COLORECTAL CA SCREEN DOC REV: CPT | Performed by: ORTHOPAEDIC SURGERY

## 2025-03-04 NOTE — PROGRESS NOTES
Chief Complaint   Patient presents with    Wrist Pain     B/L hands follow up after injections with Dr. Callahan. Seeing improvement in both hands. Not having as pain or numbness.        Killian Au is a 55 y.o. year old  male who presents for evaluation of bilateral wrist pain L>R. He stated he is much better than when I saw him last. He had injections done and good relief.     Past Medical History:   Diagnosis Date    Family history of early CAD     Mother age 45    MGM CABG x 3    [facility]     Past Surgical History:   Procedure Laterality Date    CHOLECYSTECTOMY      FEMUR FRACTURE SURGERY  1984    right       Current Outpatient Medications:     celecoxib (CELEBREX) 200 MG capsule, Take 1 capsule by mouth daily, Disp: 30 capsule, Rfl: 3    gabapentin (NEURONTIN) 300 MG capsule, Take 1 capsule by mouth 3 times daily., Disp: , Rfl:     LORazepam (ATIVAN) 1 MG tablet, Take 1 tablet by mouth every 6 hours as needed for Anxiety., Disp: , Rfl:     pantoprazole (PROTONIX) 40 MG injection, Infuse 40 mg intravenously daily, Disp: , Rfl:     losartan (COZAAR) 50 MG tablet, Take 1 tablet by mouth daily, Disp: , Rfl:     aspirin 81 MG EC tablet, Take 1 tablet by mouth daily, Disp: 30 tablet, Rfl: 3    Ergocalciferol (VITAMIN D2 PO), Take by mouth Indications: 50,000  twice a week, Disp: , Rfl:     omeprazole (PRILOSEC) 40 MG delayed release capsule, Take 1 capsule by mouth daily, Disp: , Rfl:     aspirin 81 MG chewable tablet, Take 1 tablet by mouth daily, Disp: , Rfl:     nitroGLYCERIN (NITROSTAT) 0.4 MG SL tablet, Place 1 tablet under the tongue every 5 minutes as needed for Chest pain, Disp: , Rfl:     simvastatin (ZOCOR) 20 MG tablet, Take 1 tablet by mouth nightly, Disp: 30 tablet, Rfl: 3    lansoprazole (PREVACID) 30 MG capsule, Take 1 capsule by mouth every morning (before breakfast), Disp: 30 capsule, Rfl: 3  No Known Allergies    Social History

## 2025-05-07 ENCOUNTER — OFFICE VISIT (OUTPATIENT)
Dept: ORTHOPEDIC SURGERY | Age: 55
End: 2025-05-07

## 2025-05-07 VITALS — TEMPERATURE: 98.6 F | BODY MASS INDEX: 28.58 KG/M2 | WEIGHT: 193 LBS | HEIGHT: 69 IN

## 2025-05-07 DIAGNOSIS — G56.01 RIGHT CARPAL TUNNEL SYNDROME: Primary | ICD-10-CM

## 2025-05-07 DIAGNOSIS — G56.02 LEFT CARPAL TUNNEL SYNDROME: ICD-10-CM

## 2025-05-07 RX ORDER — BETAMETHASONE SODIUM PHOSPHATE AND BETAMETHASONE ACETATE 3; 3 MG/ML; MG/ML
6 INJECTION, SUSPENSION INTRA-ARTICULAR; INTRALESIONAL; INTRAMUSCULAR; SOFT TISSUE ONCE
Status: COMPLETED | OUTPATIENT
Start: 2025-05-07 | End: 2025-05-07

## 2025-05-07 RX ORDER — LIDOCAINE HYDROCHLORIDE 10 MG/ML
1 INJECTION, SOLUTION INFILTRATION; PERINEURAL ONCE
Status: COMPLETED | OUTPATIENT
Start: 2025-05-07 | End: 2025-05-07

## 2025-05-07 RX ADMIN — LIDOCAINE HYDROCHLORIDE 1 ML: 10 INJECTION, SOLUTION INFILTRATION; PERINEURAL at 08:20

## 2025-05-07 RX ADMIN — BETAMETHASONE SODIUM PHOSPHATE AND BETAMETHASONE ACETATE 6 MG: 3; 3 INJECTION, SUSPENSION INTRA-ARTICULAR; INTRALESIONAL; INTRAMUSCULAR; SOFT TISSUE at 08:19

## 2025-05-07 NOTE — PROGRESS NOTES
equipment, or special requirements.    PROCEDURE DETAILS    The procedure was carried out under sterile technique.    Patient Position: Seated.    Localization Process: The RIGHT volar wrist/carpal tunnel was evaluated under ultrasound prior to starting the procedure. The skin was prepped with Betadine and Alcohol.    Approach: In-plane.    Local Anesthesia: Local anesthesia was obtained with vapocoolant cold spray.    Injection/Aspiration: A 25-gauge 1.5-inch needle was advanced from an in-plane ulnar to radial approach into the carpal tunnel adjacent to the median nerve. After visualization of the needle tip in the target area and negative aspiration for blood, a mixture of 0.5 cc of 1% lidocaine and 0.5 cc of betamethasone (6 mg/cc) was injected around the median nerve creating a halo of medication around the nerve within the carpal tunnel with excellent sonographic flow. Images of procedure were permanently recorded.    Localization Process: The LEFT volar wrist/carpal tunnel was evaluated under ultrasound prior to starting the procedure. The skin was prepped with Betadine.    Approach: In-plane.    Local Anesthesia: Local anesthesia was obtained with vapocoolant cold spray.    Injection/Aspiration: A 25-gauge 1.5-inch needle was advanced from an in-plane ulnar to radial approach into the carpal tunnel adjacent to the median nerve. After visualization of the needle tip in the target area and negative aspiration for blood, a mixture of 0.5 cc of 1% lidocaine and 0.5 cc of betamethasone (6 mg/cc) was injected around the median nerve creating a halo of medication around the nerve within the carpal tunnel with excellent sonographic flow. Images of procedure were permanently recorded.    Postprocedure Care: The patient will avoid heavy exertion with the hand/wrist and avoid soaking the hand under water for two days. The patient will contact me with any problems related to the injection.    PATIENT EDUCATION    Ready

## 2025-07-31 DIAGNOSIS — I71.43 INFRARENAL ABDOMINAL AORTIC ANEURYSM (AAA) WITHOUT RUPTURE: Primary | ICD-10-CM

## 2025-07-31 DIAGNOSIS — I77.1 RIGHT ILIAC ARTERY STENOSIS: ICD-10-CM

## 2025-08-14 ENCOUNTER — HOSPITAL ENCOUNTER (OUTPATIENT)
Dept: CARDIOLOGY | Age: 55
Discharge: HOME | End: 2025-08-16
Payer: COMMERCIAL

## 2025-08-14 ENCOUNTER — OFFICE VISIT (OUTPATIENT)
Dept: VASCULAR SURGERY | Age: 55
End: 2025-08-14
Payer: COMMERCIAL

## 2025-08-14 DIAGNOSIS — I71.43 INFRARENAL ABDOMINAL AORTIC ANEURYSM (AAA) WITHOUT RUPTURE: ICD-10-CM

## 2025-08-14 DIAGNOSIS — I73.9 PVD (PERIPHERAL VASCULAR DISEASE) WITH CLAUDICATION: Primary | ICD-10-CM

## 2025-08-14 DIAGNOSIS — I77.1 RIGHT ILIAC ARTERY STENOSIS: ICD-10-CM

## 2025-08-14 DIAGNOSIS — I70.209 FEMORAL-POPLITEAL ATHEROSCLEROSIS: ICD-10-CM

## 2025-08-14 LAB
VAS AORTA DIST AP: 4.81 CM
VAS AORTA DIST TR: 4.83 CM
VAS AORTA MID AP: 2.3 CM
VAS AORTA MID TRANS: 2.15 CM
VAS AORTA PROX AP: 2.6 CM
VAS AORTA PROX TR: 2.44 CM
VAS LEFT COM ILIAC AP: 1.56 CM
VAS LEFT COM ILIAC TRANS: 1.6 CM
VAS RIGHT COM ILIAC AP: 1.81 CM
VAS RIGHT COM ILIAC TRANS: 1.78 CM

## 2025-08-14 PROCEDURE — 93976 VASCULAR STUDY: CPT

## 2025-08-14 PROCEDURE — 3017F COLORECTAL CA SCREEN DOC REV: CPT | Performed by: SURGERY

## 2025-08-14 PROCEDURE — 99213 OFFICE O/P EST LOW 20 MIN: CPT | Performed by: SURGERY

## 2025-08-14 PROCEDURE — G8419 CALC BMI OUT NRM PARAM NOF/U: HCPCS | Performed by: SURGERY

## 2025-08-14 PROCEDURE — G8427 DOCREV CUR MEDS BY ELIG CLIN: HCPCS | Performed by: SURGERY

## 2025-08-14 PROCEDURE — 1036F TOBACCO NON-USER: CPT | Performed by: SURGERY
